# Patient Record
Sex: FEMALE | Race: BLACK OR AFRICAN AMERICAN | Employment: UNEMPLOYED | URBAN - METROPOLITAN AREA
[De-identification: names, ages, dates, MRNs, and addresses within clinical notes are randomized per-mention and may not be internally consistent; named-entity substitution may affect disease eponyms.]

---

## 2017-06-21 ENCOUNTER — APPOINTMENT (OUTPATIENT)
Dept: ULTRASOUND IMAGING | Age: 22
End: 2017-06-21
Attending: INTERNAL MEDICINE
Payer: SELF-PAY

## 2017-06-21 ENCOUNTER — APPOINTMENT (OUTPATIENT)
Dept: GENERAL RADIOLOGY | Age: 22
End: 2017-06-21
Attending: EMERGENCY MEDICINE
Payer: SELF-PAY

## 2017-06-21 ENCOUNTER — APPOINTMENT (OUTPATIENT)
Dept: CT IMAGING | Age: 22
End: 2017-06-21
Attending: EMERGENCY MEDICINE
Payer: SELF-PAY

## 2017-06-21 ENCOUNTER — HOSPITAL ENCOUNTER (EMERGENCY)
Age: 22
Discharge: HOME | End: 2017-06-21
Attending: EMERGENCY MEDICINE
Payer: SELF-PAY

## 2017-06-21 VITALS
BODY MASS INDEX: 32.1 KG/M2 | SYSTOLIC BLOOD PRESSURE: 119 MMHG | OXYGEN SATURATION: 100 % | HEART RATE: 80 BPM | DIASTOLIC BLOOD PRESSURE: 59 MMHG | HEIGHT: 61 IN | RESPIRATION RATE: 18 BRPM | WEIGHT: 170 LBS | TEMPERATURE: 98.2 F

## 2017-06-21 DIAGNOSIS — R05.9 COUGH: Primary | ICD-10-CM

## 2017-06-21 DIAGNOSIS — R07.81 PLEURODYNIA: ICD-10-CM

## 2017-06-21 DIAGNOSIS — R93.89 ABNORMAL CHEST CT: ICD-10-CM

## 2017-06-21 DIAGNOSIS — R79.89 ELEVATED D-DIMER: ICD-10-CM

## 2017-06-21 PROBLEM — R07.9 CHEST PAIN: Status: ACTIVE | Noted: 2017-06-21

## 2017-06-21 LAB
ALBUMIN SERPL BCP-MCNC: 4 G/DL (ref 3.5–5)
ALBUMIN/GLOB SERPL: 0.8 {RATIO} (ref 1.2–3.5)
ALP SERPL-CCNC: 131 U/L (ref 50–136)
ALT SERPL-CCNC: 18 U/L (ref 12–65)
ANION GAP BLD CALC-SCNC: 6 MMOL/L (ref 7–16)
AST SERPL W P-5'-P-CCNC: 23 U/L (ref 15–37)
ATRIAL RATE: 71 BPM
BASOPHILS # BLD AUTO: 0 K/UL (ref 0–0.2)
BASOPHILS # BLD: 0 % (ref 0–2)
BILIRUB SERPL-MCNC: 0.3 MG/DL (ref 0.2–1.1)
BUN SERPL-MCNC: 8 MG/DL (ref 6–23)
CALCIUM SERPL-MCNC: 9.1 MG/DL (ref 8.3–10.4)
CALCULATED P AXIS, ECG09: 54 DEGREES
CALCULATED R AXIS, ECG10: 47 DEGREES
CALCULATED T AXIS, ECG11: 23 DEGREES
CHLORIDE SERPL-SCNC: 101 MMOL/L (ref 98–107)
CO2 SERPL-SCNC: 32 MMOL/L (ref 21–32)
CREAT SERPL-MCNC: 0.62 MG/DL (ref 0.6–1)
D DIMER PPP FEU-MCNC: 0.62 UG/ML(FEU)
DIAGNOSIS, 93000: NORMAL
DIFFERENTIAL METHOD BLD: ABNORMAL
EOSINOPHIL # BLD: 0.1 K/UL (ref 0–0.8)
EOSINOPHIL NFR BLD: 1 % (ref 0.5–7.8)
ERYTHROCYTE [DISTWIDTH] IN BLOOD BY AUTOMATED COUNT: 14.2 % (ref 11.9–14.6)
GLOBULIN SER CALC-MCNC: 5.1 G/DL (ref 2.3–3.5)
GLUCOSE SERPL-MCNC: 101 MG/DL (ref 65–100)
HCG UR QL: NEGATIVE
HCT VFR BLD AUTO: 40.1 % (ref 35.8–46.3)
HGB BLD-MCNC: 13.1 G/DL (ref 11.7–15.4)
IMM GRANULOCYTES # BLD: 0 K/UL (ref 0–0.5)
IMM GRANULOCYTES NFR BLD AUTO: 0.5 % (ref 0–5)
LIPASE SERPL-CCNC: 94 U/L (ref 73–393)
LYMPHOCYTES # BLD AUTO: 16 % (ref 13–44)
LYMPHOCYTES # BLD: 0.7 K/UL (ref 0.5–4.6)
MCH RBC QN AUTO: 26.6 PG (ref 26.1–32.9)
MCHC RBC AUTO-ENTMCNC: 32.7 G/DL (ref 31.4–35)
MCV RBC AUTO: 81.3 FL (ref 79.6–97.8)
MONOCYTES # BLD: 0.4 K/UL (ref 0.1–1.3)
MONOCYTES NFR BLD AUTO: 9 % (ref 4–12)
NEUTS SEG # BLD: 3.2 K/UL (ref 1.7–8.2)
NEUTS SEG NFR BLD AUTO: 74 % (ref 43–78)
P-R INTERVAL, ECG05: 140 MS
PLATELET # BLD AUTO: 333 K/UL (ref 150–450)
PMV BLD AUTO: 9.1 FL (ref 10.8–14.1)
POTASSIUM SERPL-SCNC: 4.4 MMOL/L (ref 3.5–5.1)
PROT SERPL-MCNC: 9.1 G/DL (ref 6.3–8.2)
Q-T INTERVAL, ECG07: 370 MS
QRS DURATION, ECG06: 70 MS
QTC CALCULATION (BEZET), ECG08: 402 MS
RBC # BLD AUTO: 4.93 M/UL (ref 4.05–5.25)
SODIUM SERPL-SCNC: 139 MMOL/L (ref 136–145)
TROPONIN I SERPL-MCNC: <0.02 NG/ML (ref 0.02–0.05)
VENTRICULAR RATE, ECG03: 71 BPM
WBC # BLD AUTO: 4.4 K/UL (ref 4.3–11.1)

## 2017-06-21 PROCEDURE — 71260 CT THORAX DX C+: CPT

## 2017-06-21 PROCEDURE — 81003 URINALYSIS AUTO W/O SCOPE: CPT | Performed by: EMERGENCY MEDICINE

## 2017-06-21 PROCEDURE — 74011636320 HC RX REV CODE- 636/320: Performed by: EMERGENCY MEDICINE

## 2017-06-21 PROCEDURE — 74011000250 HC RX REV CODE- 250: Performed by: EMERGENCY MEDICINE

## 2017-06-21 PROCEDURE — 93005 ELECTROCARDIOGRAM TRACING: CPT | Performed by: EMERGENCY MEDICINE

## 2017-06-21 PROCEDURE — 81025 URINE PREGNANCY TEST: CPT

## 2017-06-21 PROCEDURE — 74011000258 HC RX REV CODE- 258: Performed by: EMERGENCY MEDICINE

## 2017-06-21 PROCEDURE — 74022 RADEX COMPL AQT ABD SERIES: CPT

## 2017-06-21 PROCEDURE — 99285 EMERGENCY DEPT VISIT HI MDM: CPT | Performed by: EMERGENCY MEDICINE

## 2017-06-21 PROCEDURE — 74011250636 HC RX REV CODE- 250/636: Performed by: EMERGENCY MEDICINE

## 2017-06-21 PROCEDURE — 93970 EXTREMITY STUDY: CPT

## 2017-06-21 PROCEDURE — 85025 COMPLETE CBC W/AUTO DIFF WBC: CPT | Performed by: EMERGENCY MEDICINE

## 2017-06-21 PROCEDURE — 85379 FIBRIN DEGRADATION QUANT: CPT | Performed by: EMERGENCY MEDICINE

## 2017-06-21 PROCEDURE — 84484 ASSAY OF TROPONIN QUANT: CPT | Performed by: EMERGENCY MEDICINE

## 2017-06-21 PROCEDURE — 96374 THER/PROPH/DIAG INJ IV PUSH: CPT | Performed by: EMERGENCY MEDICINE

## 2017-06-21 PROCEDURE — 80053 COMPREHEN METABOLIC PANEL: CPT | Performed by: EMERGENCY MEDICINE

## 2017-06-21 PROCEDURE — 83690 ASSAY OF LIPASE: CPT | Performed by: EMERGENCY MEDICINE

## 2017-06-21 PROCEDURE — 94644 CONT INHLJ TX 1ST HOUR: CPT

## 2017-06-21 PROCEDURE — 99284 EMERGENCY DEPT VISIT MOD MDM: CPT | Performed by: INTERNAL MEDICINE

## 2017-06-21 RX ORDER — IPRATROPIUM BROMIDE 0.5 MG/2.5ML
0.5 SOLUTION RESPIRATORY (INHALATION)
Status: COMPLETED | OUTPATIENT
Start: 2017-06-21 | End: 2017-06-21

## 2017-06-21 RX ORDER — ALBUTEROL SULFATE 0.83 MG/ML
5 SOLUTION RESPIRATORY (INHALATION)
Status: COMPLETED | OUTPATIENT
Start: 2017-06-21 | End: 2017-06-21

## 2017-06-21 RX ORDER — SODIUM CHLORIDE 0.9 % (FLUSH) 0.9 %
10 SYRINGE (ML) INJECTION
Status: COMPLETED | OUTPATIENT
Start: 2017-06-21 | End: 2017-06-21

## 2017-06-21 RX ADMIN — Medication 10 ML: at 12:13

## 2017-06-21 RX ADMIN — IOPAMIDOL 100 ML: 755 INJECTION, SOLUTION INTRAVENOUS at 12:13

## 2017-06-21 RX ADMIN — ALBUTEROL SULFATE 5 MG: 2.5 SOLUTION RESPIRATORY (INHALATION) at 11:01

## 2017-06-21 RX ADMIN — IPRATROPIUM BROMIDE 0.5 MG: 0.5 SOLUTION RESPIRATORY (INHALATION) at 11:01

## 2017-06-21 RX ADMIN — SODIUM CHLORIDE 100 ML: 900 INJECTION, SOLUTION INTRAVENOUS at 12:13

## 2017-06-21 RX ADMIN — METHYLPREDNISOLONE SODIUM SUCCINATE 125 MG: 125 INJECTION, POWDER, FOR SOLUTION INTRAMUSCULAR; INTRAVENOUS at 10:12

## 2017-06-21 NOTE — ED NOTES
Resting HR 86 and O2 sat 100% RA. Upon standing, HR up to 115, decreased to 100-105 while walking. O2 sat 100% RA while ambulating. Pt states no discomfort or SOB at this time.

## 2017-06-21 NOTE — DISCHARGE INSTRUCTIONS
Pulmonary Embolism: Care Instructions  Your Care Instructions  Pulmonary embolism is the sudden blockage of an artery in the lung. Blood clots in the deep veins of the leg or pelvis (deep vein thrombosis, or DVT) are the most common cause. These blood clots can travel to the lungs. Pulmonary embolism can be very serious. Because you have had one pulmonary embolism, you are at greater risk for having another one. But you can take steps to prevent another pulmonary embolism by following your doctor's instructions. You will probably take a prescription blood-thinning medicine to prevent blood clots. A blood thinner can stop a blood clot from growing larger and prevent new clots from forming. Follow-up care is a key part of your treatment and safety. Be sure to make and go to all appointments, and call your doctor if you are having problems. It's also a good idea to know your test results and keep a list of the medicines you take. How can you care for yourself at home? · Take your medicines exactly as prescribed. Call your doctor if you think you are having a problem with your medicine. You will get more details on the specific medicines your doctor prescribes. · If you are taking a blood thinner, be sure you get instructions about how to take your medicine safely. Blood thinners can cause serious bleeding problems. Preventing future pulmonary embolisms  · Exercise. Keep blood moving in your legs to keep clots from forming. If you are traveling by car, stop every hour or so. Get out and walk around for a few minutes. If you are traveling by bus, train, or plane, get out of your seat and walk up and down the aisles every hour or so. You also can do leg exercises while you are seated. Pump your feet up and down by pulling your toes up toward your knees then pointing them down. · Get up out of bed as soon as possible after an illness or surgery. · Do not smoke.  If you need help quitting, talk to your doctor about stop-smoking programs and medicines. These can increase your chances of quitting for good. · Check with your doctor before taking hormone or birth control pills. These may increase your risk of blot clots. · Ask your doctor about wearing compression stockings to help prevent blood clots in your legs. You can buy these with a prescription at medical supply stores and some drugstores. When should you call for help? Call 911 anytime you think you may need emergency care. For example, call if:  · You have shortness of breath. · You have chest pain. · You passed out (lost consciousness). · You cough up blood. Call your doctor now or seek immediate medical care if:  · You have new or worsening pain or swelling in your leg. Watch closely for changes in your health, and be sure to contact your doctor if:  · You do not get better as expected. Where can you learn more? Go to http://macarena-елена.info/. Enter L656 in the search box to learn more about \"Pulmonary Embolism: Care Instructions. \"  Current as of: June 4, 2016  Content Version: 11.3  © 5955-5277 [employer]. Care instructions adapted under license by [employer] (which disclaims liability or warranty for this information). If you have questions about a medical condition or this instruction, always ask your healthcare professional. [place] [address] any warranty or liability for your use of this information.

## 2017-06-21 NOTE — H&P
Consult Note      Ken Benson    6/21/2017    Date of Admission:  6/21/2017    The patient's chart is reviewed and the patient is discussed with the staff. Subjective:     Patient is a 24 y.o.  female presents with cough and right side pain under her right breast.  Complains of shortness of breath. Abdominal series with no acute findings. Chest CT was done with small nonobstructive left PE. She had been feeling well and developed \"cold\" symptoms with productive cough with yellow sputum on Sunday. Has not a fever or chills but felt some nausea. Denies recent travel, prolong sitting, is not on BCP and pregnancy test is negative. She denies tobacco abuse. Has a family history of spontaneous blood clots in grandmother and an uncle. Home [employer] company none. Review of Systems  A comprehensive review of systems was negative except for: Constitutional: positive for fatigue and malaise  Respiratory: positive for cough or sputum  Gastrointestinal: positive for nausea  Musculoskeletal: positive for right side chest pain, under right breast    Patient Active Problem List   Diagnosis Code    Elevated d-dimer R79.89    Chest pain R07.9    Abnormal chest CT R93.8       None       History reviewed. No pertinent past medical history. History reviewed. No pertinent surgical history. Social History     Social History    Marital status: SINGLE     Spouse name: N/A    Number of children: N/A    Years of education: N/A     Occupational History    Not on file. Social History Main Topics    Smoking status: Never Smoker    Smokeless tobacco: Never Used    Alcohol use No    Drug use: No    Sexual activity: Not on file     Other Topics Concern    Not on file     Social History Narrative    No narrative on file     History reviewed. No pertinent family history. No Known Allergies    No current facility-administered medications for this encounter.       No current outpatient prescriptions on file. Objective:     Vitals:    06/21/17 0931 06/21/17 1016 06/21/17 1030 06/21/17 1104   BP: 124/74 121/68 120/66    Pulse: 80      Resp: 18      Temp: 98.2 °F (36.8 °C)      SpO2: 100%   100%   Weight: 170 lb (77.1 kg)      Height: 5' 0.5\" (1.537 m)          PHYSICAL EXAM     Constitutional:  the patient is well developed and in no acute distress, sat 100% on room air  EENMT:  Sclera clear, pupils equal, oral mucosa moist  Respiratory: clear anterior and posterior  Cardiovascular:  RRR without M,G,R  Gastrointestinal: soft and non-tender; with positive bowel sounds. Musculoskeletal: warm without cyanosis. There is no lower leg edema. Skin:  no jaundice or rashes, no wounds   Neurologic: no gross neuro deficits     Psychiatric:  alert and oriented x 3    Chest CT 6/21/17:  Suspect tiny nonobstructing left lower lobe PE. Abdomen series 6/21/17:  No acute findings in the chest or abdomen. CXR:            Recent Labs      06/21/17   0934   WBC  4.4   HGB  13.1   HCT  40.1   PLT  333     Recent Labs      06/21/17   0934   NA  139   K  4.4   CL  101   GLU  101*   CO2  32   BUN  8   CREA  0.62   CA  9.1   TROIQ  <0.02*   ALB  4.0   TBILI  0.3   ALT  18   SGOT  23     No results for input(s): PH, PCO2, PO2, HCO3 in the last 72 hours. No results for input(s): LCAD, LAC in the last 72 hours. Assessment:  (Medical Decision Making)     Hospital Problems  Date Reviewed: 6/21/2017          Codes Class Noted POA    Elevated d-dimer ICD-10-CM: R79.89  ICD-9-CM: 790.92  6/21/2017 Yes    Very mild    Chest pain ICD-10-CM: R07.9  ICD-9-CM: 786.50  6/21/2017 Yes    Pain is not in area of clot. Abnormal chest CT ICD-10-CM: R93.8  ICD-9-CM: 793.2  6/21/2017 Yes    Overview Signed 6/21/2017  1:16 PM by Gloria Carmen NP     6/21/17:  Suspect tiny nonobstructing left lower lobe PE. Unusual appearance for \"clot\" in LLL. Almost looks like a chronic clot which has recanalized. Plan:  (Medical Decision Making)       --Supplemental O2 if needed   --Respiratory nebulizer treatments  --DDimer slightly elevated 0.62  --Check LE ultrasound--if negative will check room air ambulating sat. More than 50% of the time documented was spent in face-to-face contact with the patient and in the care of the patient on the floor/unit where the patient is located. Ilene Lauren, NP     Lungs:  clear  Heart:  RRR with no Murmur/Rubs/Gallops    Additional Comments: Will admit for further medical management if venous US + or significant dyspnea/desaturation with activity. If venous US negative and no evidence of compromise with activity or desats, consider OP F/U off anticoagulants with repeat D-dimer in 1 week. I have spoken with and examined the patient. I agree with the above assessment and plan as documented. Annabelle Villagomez MD     ADDENDUM    Venous US negative for DVT. Ambulated on RA with no desats or evidence of hemodynamic change. At this point, feel pt does not require anticoagulation or admission. Risk of bleeding with menstrual periods discussed with pt. Recommend repeat D-dimer in OP lab in 1 week. If she has new symptoms of dyspnea, chest pain, hemoptysis then she should come to ER for immediate evaluation.     Annabelle Villagomez MD

## 2017-06-21 NOTE — ED PROVIDER NOTES
HPI Comments: Patient is a 23 yo female with cough and right sided chest pain. Patient states she has had a cough for 1 week productive of sputum (yellow, non-bloody). States starting this past weekend she developed pain in her right chest with the cough. She denies pain at rest, states only with coughing and bending/twisting and certain positions. No vomiting, no abdominal pain, no pain with urination, no vaginal bleeding or discharge, no further complaints. Not on birth control, no recent travel, no swelling of her legs. Patient is a 24 y.o. female presenting with flank pain. The history is provided by the patient. No  was used. Flank Pain    Associated symptoms include chest pain. Pertinent negatives include no fever, no headaches, no abdominal pain, no dysuria, no pelvic pain and no weakness. History reviewed. No pertinent past medical history. History reviewed. No pertinent surgical history. History reviewed. No pertinent family history. Social History     Social History    Marital status: SINGLE     Spouse name: N/A    Number of children: N/A    Years of education: N/A     Occupational History    Not on file. Social History Main Topics    Smoking status: Never Smoker    Smokeless tobacco: Never Used    Alcohol use No    Drug use: No    Sexual activity: Not on file     Other Topics Concern    Not on file     Social History Narrative    No narrative on file         ALLERGIES: Review of patient's allergies indicates no known allergies. Review of Systems   Constitutional: Negative for chills and fever. HENT: Positive for congestion, postnasal drip and rhinorrhea. Negative for trouble swallowing. Eyes: Negative for visual disturbance. Respiratory: Positive for cough. Negative for shortness of breath. Cardiovascular: Positive for chest pain. Negative for leg swelling.    Gastrointestinal: Negative for abdominal pain, diarrhea, nausea and vomiting. Genitourinary: Negative for dysuria, pelvic pain, urgency, vaginal bleeding and vaginal discharge. Musculoskeletal: Negative for back pain and neck pain. Skin: Negative for rash. Neurological: Negative for weakness and headaches. Psychiatric/Behavioral: The patient is not nervous/anxious. Vitals:    06/21/17 0931   BP: 124/74   Pulse: 80   Resp: 18   Temp: 98.2 °F (36.8 °C)   SpO2: 100%   Weight: 77.1 kg (170 lb)   Height: 5' 0.5\" (1.537 m)            Physical Exam   Constitutional: She is oriented to person, place, and time. She appears well-developed and well-nourished. HENT:   Head: Normocephalic. Right Ear: External ear normal.   Left Ear: External ear normal.   Eyes: Conjunctivae and EOM are normal. Pupils are equal, round, and reactive to light. Neck: Normal range of motion. Neck supple. No tracheal deviation present. Cardiovascular: Normal rate, regular rhythm, normal heart sounds and intact distal pulses. No murmur heard. Pulmonary/Chest: Effort normal and breath sounds normal. No respiratory distress. Abdominal: Soft. There is no tenderness. Non-tender to deep palpation through-out entire abdomen. Negative murphys sign, no CVA tenderness   Musculoskeletal: Normal range of motion. Neurological: She is alert and oriented to person, place, and time. No cranial nerve deficit. Skin: No rash noted. Nursing note and vitals reviewed.        MDM  Number of Diagnoses or Management Options  Cough: new and requires workup     Amount and/or Complexity of Data Reviewed  Clinical lab tests: reviewed and ordered  Tests in the radiology section of CPT®: ordered and reviewed  Tests in the medicine section of CPT®: ordered and reviewed  Review and summarize past medical records: yes    Risk of Complications, Morbidity, and/or Mortality  Presenting problems: high  Diagnostic procedures: high  Management options: high    Patient Progress  Patient progress: stable    ED Course Procedures     Recent Results (from the past 12 hour(s))   CBC WITH AUTOMATED DIFF    Collection Time: 06/21/17  9:34 AM   Result Value Ref Range    WBC 4.4 4.3 - 11.1 K/uL    RBC 4.93 4.05 - 5.25 M/uL    HGB 13.1 11.7 - 15.4 g/dL    HCT 40.1 35.8 - 46.3 %    MCV 81.3 79.6 - 97.8 FL    MCH 26.6 26.1 - 32.9 PG    MCHC 32.7 31.4 - 35.0 g/dL    RDW 14.2 11.9 - 14.6 %    PLATELET 411 342 - 399 K/uL    MPV 9.1 (L) 10.8 - 14.1 FL    DF AUTOMATED      NEUTROPHILS 74 43 - 78 %    LYMPHOCYTES 16 13 - 44 %    MONOCYTES 9 4.0 - 12.0 %    EOSINOPHILS 1 0.5 - 7.8 %    BASOPHILS 0 0.0 - 2.0 %    IMMATURE GRANULOCYTES 0.5 0.0 - 5.0 %    ABS. NEUTROPHILS 3.2 1.7 - 8.2 K/UL    ABS. LYMPHOCYTES 0.7 0.5 - 4.6 K/UL    ABS. MONOCYTES 0.4 0.1 - 1.3 K/UL    ABS. EOSINOPHILS 0.1 0.0 - 0.8 K/UL    ABS. BASOPHILS 0.0 0.0 - 0.2 K/UL    ABS. IMM. GRANS. 0.0 0.0 - 0.5 K/UL   METABOLIC PANEL, COMPREHENSIVE    Collection Time: 06/21/17  9:34 AM   Result Value Ref Range    Sodium 139 136 - 145 mmol/L    Potassium 4.4 3.5 - 5.1 mmol/L    Chloride 101 98 - 107 mmol/L    CO2 32 21 - 32 mmol/L    Anion gap 6 (L) 7 - 16 mmol/L    Glucose 101 (H) 65 - 100 mg/dL    BUN 8 6 - 23 MG/DL    Creatinine 0.62 0.6 - 1.0 MG/DL    GFR est AA >60 >60 ml/min/1.73m2    GFR est non-AA >60 >60 ml/min/1.73m2    Calcium 9.1 8.3 - 10.4 MG/DL    Bilirubin, total 0.3 0.2 - 1.1 MG/DL    ALT (SGPT) 18 12 - 65 U/L    AST (SGOT) 23 15 - 37 U/L    Alk.  phosphatase 131 50 - 136 U/L    Protein, total 9.1 (H) 6.3 - 8.2 g/dL    Albumin 4.0 3.5 - 5.0 g/dL    Globulin 5.1 (H) 2.3 - 3.5 g/dL    A-G Ratio 0.8 (L) 1.2 - 3.5     LIPASE    Collection Time: 06/21/17  9:34 AM   Result Value Ref Range    Lipase 94 73 - 393 U/L   TROPONIN I    Collection Time: 06/21/17  9:34 AM   Result Value Ref Range    Troponin-I, Qt. <0.02 (L) 0.02 - 0.05 NG/ML   D DIMER    Collection Time: 06/21/17  9:34 AM   Result Value Ref Range    D DIMER 0.62 (HH) <0.55 ug/ml(FEU)   EKG, 12 LEAD, INITIAL Collection Time: 06/21/17 10:02 AM   Result Value Ref Range    Ventricular Rate 71 BPM    Atrial Rate 71 BPM    P-R Interval 140 ms    QRS Duration 70 ms    Q-T Interval 370 ms    QTC Calculation (Bezet) 402 ms    Calculated P Axis 54 degrees    Calculated R Axis 47 degrees    Calculated T Axis 23 degrees    Diagnosis       !! AGE AND GENDER SPECIFIC ECG ANALYSIS !! Sinus rhythm with sinus arrhythmia  Normal ECG  No previous ECGs available  Confirmed by Shruti Luna ([id number]) on 6/21/2017 11:13:42 AM     HCG URINE, QL. - POC    Collection Time: 06/21/17 10:05 AM   Result Value Ref Range    Pregnancy test,urine (POC) NEGATIVE  NEG       Xr Abd Acute W 1 V Chest    Result Date: 6/21/2017  Abdominal Series CLINICAL INDICATION:  Acute worsening of pleuritic right chest pain and right abdominal pain, with cough, one-week duration overall, moderate COMPARISON: None TECHNIQUE: A frontal upright view of the chest and supine and upright views of the abdomen were obtained. FINDINGS: The lungs are clear. No infiltrate or effusion evident. The mediastinal contours are normal. Bones demonstrate no displaced fractures. Flat and upright views of the abdomen show no evidence of obstruction. No evidence of free air. No definite abnormal masses or calculi are seen. Moderate to large volume stool compatible with constipation. IMPRESSION:  No acute findings in the chest or abdomen. Ct Chest W Cont    Result Date: 6/21/2017  CT Chest with contrast Clinical Indication:  Severe right pleuritic chest pain acute with elevated d-dimer Comparison:  Radiography of the same day Technique:  Automated exposure Control was used. Contiguous axial images were obtained from the neck base through the upper abdomen following the uneventful administration of 100 cc optiray 350 intravenous contrast. Pulmonary embolus protocol was used. Coronal reconstructions were done for further evaluation of vessels.  Findings:  Pulmonary arteries are opacified with contrast and demonstrate no filling defects centrally. Within a left lower lobe subsegmental vessels (axial image 54) there is a tiny linear filling defect. The subsegmental vessels are not entirely evaluated otherwise. The aorta is normal caliber with no evidence of acute abnormality. The heart size is borderline to mildly enlarged. No pleural or pericardial effusion. No evidence of ventricular septal bowing. No evidence of thoracic lymphadenopathy. Limited upper abdominal views demonstrate no adrenal mass or acute abnormality. Bone windows demonstrate no acute or suspicious osseous lesion. Lung windows are slightly limited by breathing motion artifact. Central airways are unremarkable. No evidence of pneumothorax, consolidation or lung mass. Impression: Suspect tiny nonobstructing left lower lobe PE. DC5 The critical results contained in this report were communicated Dr. Bipin Mays by phone at 12:28 PM.     Duplex Lower Ext Venous Bilat    Result Date: 6/21/2017  Bilateral lower extremity venous ultrasound INDICATION:  Mild swelling, both legs with dyspnea and acute pulmonary embolism COMPARISON: none Doppler ultrasound of each lower extremity was performed. FINDINGS: There is no evidence of Salmon's cyst. Right: There is normal flow in the common femoral, deep femoral, greater saphenous, femoral and popliteal veins. Normal compression and augmentation demonstrated. The proximal calf veins are also patent. Left: There is normal flow in the common femoral, deep femoral, greater saphenous, femoral and popliteal veins. Normal compression and augmentation demonstrated. The proximal calf veins are also patent. IMPRESSION: No evidence of deep venous thrombosis in either lower extremity. 23 yo female with right chest pain:       patient's imaging above concerning for possible very small pulmonary embolism.   Patient was seen and evaluated by Dr. Jenny Castaneda from pulmonology who feels this is likely a resolved pulmonary embolism versus artifact. He discussed with the patient all options including possibly starting anticoagulation therapy and/or admission and full prothrombotic workup and she elects for follow-up as an outpatient to trend d-dimer and will follow-up in pulmonology as well as with the free clinic is very strict return precautions for any change or worsening pain, any shortness of breath, any LOC or any further concerns. She is very well appearing, hemodynamically stable, walks with O2 sat 100% in ED. Discharged by Dr. Annamarie Madrigal from ED.